# Patient Record
Sex: MALE | Race: BLACK OR AFRICAN AMERICAN | NOT HISPANIC OR LATINO | Employment: UNEMPLOYED | URBAN - METROPOLITAN AREA
[De-identification: names, ages, dates, MRNs, and addresses within clinical notes are randomized per-mention and may not be internally consistent; named-entity substitution may affect disease eponyms.]

---

## 2023-03-20 ENCOUNTER — HOSPITAL ENCOUNTER (EMERGENCY)
Facility: HOSPITAL | Age: 23
Discharge: HOME | End: 2023-03-20
Attending: EMERGENCY MEDICINE
Payer: MEDICAID

## 2023-03-20 VITALS
HEART RATE: 71 BPM | DIASTOLIC BLOOD PRESSURE: 60 MMHG | TEMPERATURE: 98 F | RESPIRATION RATE: 18 BRPM | WEIGHT: 130 LBS | SYSTOLIC BLOOD PRESSURE: 121 MMHG | OXYGEN SATURATION: 98 %

## 2023-03-20 DIAGNOSIS — S00.83XA CONTUSION OF OTHER PART OF HEAD, INITIAL ENCOUNTER: Primary | ICD-10-CM

## 2023-03-20 DIAGNOSIS — S06.9X9A: ICD-10-CM

## 2023-03-20 PROCEDURE — 25000003 PHARM REV CODE 250

## 2023-03-20 PROCEDURE — 99284 EMERGENCY DEPT VISIT MOD MDM: CPT | Mod: 25

## 2023-03-20 PROCEDURE — 12013 RPR F/E/E/N/L/M 2.6-5.0 CM: CPT

## 2023-03-20 RX ORDER — ACETAMINOPHEN 500 MG
1000 TABLET ORAL
Status: COMPLETED | OUTPATIENT
Start: 2023-03-20 | End: 2023-03-20

## 2023-03-20 RX ORDER — MUPIROCIN 20 MG/G
OINTMENT TOPICAL 2 TIMES DAILY
Qty: 15 G | Refills: 0 | Status: SHIPPED | OUTPATIENT
Start: 2023-03-20 | End: 2023-03-27

## 2023-03-20 RX ORDER — LIDOCAINE HYDROCHLORIDE 10 MG/ML
10 INJECTION INFILTRATION; PERINEURAL
Status: COMPLETED | OUTPATIENT
Start: 2023-03-20 | End: 2023-03-20

## 2023-03-20 RX ORDER — ACETAMINOPHEN 500 MG
500 TABLET ORAL EVERY 6 HOURS PRN
Qty: 28 TABLET | Refills: 0 | Status: SHIPPED | OUTPATIENT
Start: 2023-03-20 | End: 2023-03-27

## 2023-03-20 RX ORDER — MUPIROCIN 20 MG/G
1 OINTMENT TOPICAL
Status: COMPLETED | OUTPATIENT
Start: 2023-03-20 | End: 2023-03-20

## 2023-03-20 RX ORDER — IBUPROFEN 600 MG/1
600 TABLET ORAL EVERY 6 HOURS PRN
Qty: 20 TABLET | Refills: 0 | Status: SHIPPED | OUTPATIENT
Start: 2023-03-20

## 2023-03-20 RX ADMIN — MUPIROCIN 22 G: 20 OINTMENT TOPICAL at 06:03

## 2023-03-20 RX ADMIN — ACETAMINOPHEN 1000 MG: 500 TABLET ORAL at 04:03

## 2023-03-20 RX ADMIN — LIDOCAINE HYDROCHLORIDE 10 ML: 10 INJECTION, SOLUTION INFILTRATION; PERINEURAL at 04:03

## 2023-03-20 NOTE — ED NOTES
Irrigated lac to forehead with sterile normal saline. Had pt wash his wound to his right hand with warm water and soap

## 2023-03-20 NOTE — ED NOTES
Pt is aaox3. Nadn. Resp eu. Vss. Applied abx ointment and nonstick dressing over lac repair. Pt ambulated to wr w/steady gait

## 2023-03-20 NOTE — ED PROVIDER NOTES
Encounter Date: 3/20/2023       History     Chief Complaint   Patient presents with    Fall     Mechanical unwitnessed fall hit left side of forehead. Pt reports LOC. PT reports getting up by self. Laceration to forehead from hitting head on car sharlene. Pt unaware of last tetanus shot.     Levy Crawford is a 22-year-old male with no pertinent past medical history who presents to the emergency department with chief complaint of fall.  Was in his usual state of health until approximately 1 hour prior to arrival when he had a mechanical trip and fall in his home and hit his head on what he believes was a car Sharlene or the side of a table.  He had been drinking alcohol prior to this event.  He reports a loss of consciousness.  Unknown how long he was unconscious for.  He woke up by himself without being shaken awake.  He denies any seizure activity or vomiting since the time of the injury.  He is not on any blood thinners.  Reports dull pain to the area at this time.    The history is provided by the patient and a friend. No  was used.   Review of patient's allergies indicates:   Allergen Reactions    Amoxicillin Swelling     History reviewed. No pertinent past medical history.  History reviewed. No pertinent surgical history.  History reviewed. No pertinent family history.  Social History     Tobacco Use    Smoking status: Never    Smokeless tobacco: Never   Substance Use Topics    Alcohol use: Yes     Comment: occassionally    Drug use: Never     Review of Systems   Constitutional:  Negative for fever.   HENT:  Negative for sore throat.    Eyes:  Negative for visual disturbance.   Respiratory:  Negative for shortness of breath.    Cardiovascular:  Negative for chest pain.   Gastrointestinal:  Negative for nausea and vomiting.   Genitourinary:  Negative for dysuria.   Musculoskeletal:  Negative for back pain.   Skin:  Negative for rash.   Neurological:  Positive for syncope and headaches. Negative for  seizures and weakness.   Hematological:  Does not bruise/bleed easily.     Physical Exam     Initial Vitals [03/20/23 0347]   BP Pulse Resp Temp SpO2   110/67 99 18 98.8 °F (37.1 °C) 99 %      MAP       --         Physical Exam    Nursing note and vitals reviewed.  Constitutional: Vital signs are normal. He appears well-developed and well-nourished. He is cooperative. He does not appear ill. No distress.   HENT:   Head: Normocephalic. Head is with abrasion, with contusion and with laceration. Head is without raccoon's eyes and without Tovar's sign.       Right Ear: External ear normal. No hemotympanum.   Left Ear: External ear normal. No hemotympanum.   Nose: Nose normal. No nasal deformity. No epistaxis.   Mouth/Throat: Uvula is midline.   Eyes: Conjunctivae and EOM are normal. Pupils are equal, round, and reactive to light.   Neck: Phonation normal.   Normal range of motion.  Cardiovascular:  Normal rate, regular rhythm, S1 normal, S2 normal and normal heart sounds.           No murmur heard.  Pulmonary/Chest: Effort normal. No respiratory distress.   Abdominal: Abdomen is flat. He exhibits no distension. There is no abdominal tenderness.   Musculoskeletal:      Cervical back: Normal range of motion.     Neurological: He is alert and oriented to person, place, and time. He has normal strength. No cranial nerve deficit or sensory deficit. Gait normal. GCS eye subscore is 4. GCS verbal subscore is 5. GCS motor subscore is 6.   Skin: Skin is warm and dry. Capillary refill takes less than 2 seconds. No rash noted.       ED Course   Lac Repair    Date/Time: 3/20/2023 5:34 AM  Performed by: Forest Darnell PA-C  Authorized by: Cordell Daniels MD     Consent:     Consent obtained:  Verbal    Consent given by:  Patient    Risks, benefits, and alternatives were discussed: yes      Risks discussed:  Infection, pain, retained foreign body and need for additional repair    Alternatives discussed:  No treatment  Universal  protocol:     Procedure explained and questions answered to patient or proxy's satisfaction: yes      Patient identity confirmed:  Verbally with patient, hospital-assigned identification number and arm band  Anesthesia:     Anesthesia method:  Local infiltration    Local anesthetic:  Lidocaine 1% w/o epi  Laceration details:     Location:  Face    Face location:  Forehead    Length (cm):  3    Depth (mm):  2  Pre-procedure details:     Preparation:  Patient was prepped and draped in usual sterile fashion  Exploration:     Limited defect created (wound extended): no      Hemostasis achieved with:  Direct pressure    Imaging obtained comment:  CT head    Imaging outcome: foreign body not noted      Wound exploration: wound explored through full range of motion and entire depth of wound visualized      Contaminated: no    Treatment:     Area cleansed with:  Saline    Amount of cleaning:  Standard    Irrigation solution:  Sterile saline    Irrigation volume:  250 mL    Irrigation method:  Pressure wash    Debridement:  None    Undermining:  None    Scar revision: no    Skin repair:     Repair method:  Sutures    Suture size:  5-0    Suture material:  Fast-absorbing gut    Suture technique:  Simple interrupted    Number of sutures:  7  Approximation:     Approximation:  Close  Repair type:     Repair type:  Simple  Post-procedure details:     Dressing:  Antibiotic ointment and sterile dressing    Procedure completion:  Tolerated well, no immediate complications  Labs Reviewed - No data to display       Imaging Results              CT Head Without Contrast (In process)                      Medications   mupirocin 2 % ointment 22 g (has no administration in time range)   LIDOcaine HCL 10 mg/ml (1%) injection 10 mL (10 mLs Infiltration Given 3/20/23 5066)   acetaminophen tablet 1,000 mg (1,000 mg Oral Given 3/20/23 4216)     Medical Decision Making:   Initial Assessment:   22-year-old male presenting to the emergency  department after a mechanical fall resulting in head trauma loss of consciousness.  Differential Diagnosis:   Differential diagnosis includes but is not limited to contusion or laceration of the scalp, skull fracture, intracranial hemorrhage, traumatic brain injury, concussion  Clinical Tests:   Radiological Study: Ordered and Reviewed  ED Management:  Patient presenting to the emergency department after a trip and fall secondary to alcohol use and resulting in head trauma with loss of consciousness.  Contusion and laceration present on the patient's forehead.  Unwitnessed fall, unclear exact mechanism of action and duration of loss of consciousness.  On physical exam, patient is in no acute distress.  He does appear mildly intoxicated.  Normal neurological exam.  Per [place] head CT rules, head CT was obtained.  Head CT with no acute intracranial abnormalities.  Head laceration repaired as described in procedure note.  Patient tolerated the procedure well with no immediate complications.    On reassessment, patient is in no acute distress and all vital signs are within normal limits.  Again, normal neurological exam.  Safe to be discharged home.  Discussed wound care.  Patient will return to the emergency department 3 days for a wound check. Return precautions were discussed, all patient questions were answered, and the patient was agreeable to the plan of care.  He was discharged home in stable condition and will follow up with his primary care provider or return to the emergency department if his symptoms worsen or do not improve.                         Clinical Impression:   Final diagnoses:  [S00.83XA] Contusion of other part of head, initial encounter (Primary)  [S06.9X9A] Acute head injury with loss of consciousness but no other complication, initial encounter        ED Disposition Condition    Discharge Stable          ED Prescriptions       Medication Sig Dispense Start Date End Date Auth. Provider     acetaminophen (TYLENOL) 500 MG tablet Take 1 tablet (500 mg total) by mouth every 6 (six) hours as needed. 28 tablet 3/20/2023 3/27/2023 Forest Darnell PA-C    ibuprofen (ADVIL,MOTRIN) 600 MG tablet Take 1 tablet (600 mg total) by mouth every 6 (six) hours as needed for Pain. 20 tablet 3/20/2023 -- Forest Darnell PA-C    mupirocin (BACTROBAN) 2 % ointment Apply topically 2 (two) times daily. for 7 days 15 g 3/20/2023 3/27/2023 Forest Darnell PA-C          Follow-up Information       Follow up With Specialties Details Why Contact Info     [facility]  Schedule an appointment as soon as possible for a visit in 3 days For wound re-check [address] [facility] [address]  [phone]      [facility] Emergency Medicine Go in 3 days For wound re-check [address] [facility] [address] [phone]             Forest Darnell PA-C  03/20/23 0549

## 2023-03-20 NOTE — ED NOTES
Pt to ED c/o lac to left forehead s/p trip and fall, pt reports LOC, had blurry vision earlier but has resolved. Denies n/v. Pt also has wound to right hand from fall

## 2023-03-20 NOTE — DISCHARGE INSTRUCTIONS
Thank you for coming to our Emergency Department today. It is important to remember that some problems are difficult to diagnose and may not be found during your first visit. Be sure to follow up with your primary care doctor and review any labs/imaging that was performed with them. If you do not have a primary care doctor, you may contact the one listed on your discharge paperwork or you may also call the [facility] Appointment Desk at [phone] to schedule an appointment with one.     All medications may potentially have side effects and it is impossible to predict which medications may give you side effects. If you feel that you are having a negative effect of any medication you should immediately stop taking them and seek medical attention.    Return to the ER with any questions/concerns, new/concerning symptoms, worsening or failure to improve. Do not drive or make any important decisions for 24 hours if you have received any pain medications, sedatives or mood altering drugs during your ER visit.

## 2024-12-06 ENCOUNTER — HOSPITAL ENCOUNTER (EMERGENCY)
Facility: HOSPITAL | Age: 24
Discharge: HOME | End: 2024-12-06
Attending: EMERGENCY MEDICINE
Payer: MEDICAID

## 2024-12-06 VITALS
OXYGEN SATURATION: 100 % | BODY MASS INDEX: 25.07 KG/M2 | DIASTOLIC BLOOD PRESSURE: 88 MMHG | SYSTOLIC BLOOD PRESSURE: 122 MMHG | HEART RATE: 65 BPM | HEIGHT: 66 IN | TEMPERATURE: 99 F | RESPIRATION RATE: 20 BRPM | WEIGHT: 156 LBS

## 2024-12-06 DIAGNOSIS — R51.9 ACUTE NONINTRACTABLE HEADACHE, UNSPECIFIED HEADACHE TYPE: Primary | ICD-10-CM

## 2024-12-06 LAB — POCT GLUCOSE: 77 MG/DL (ref 70–110)

## 2024-12-06 PROCEDURE — 25000003 PHARM REV CODE 250: Performed by: PHYSICIAN ASSISTANT

## 2024-12-06 PROCEDURE — 99283 EMERGENCY DEPT VISIT LOW MDM: CPT

## 2024-12-06 PROCEDURE — 82962 GLUCOSE BLOOD TEST: CPT

## 2024-12-06 RX ORDER — AZELASTINE 1 MG/ML
1 SPRAY, METERED NASAL 2 TIMES DAILY
Qty: 30 ML | Refills: 0 | Status: SHIPPED | OUTPATIENT
Start: 2024-12-06 | End: 2025-12-06

## 2024-12-06 RX ORDER — CETIRIZINE HYDROCHLORIDE 10 MG/1
10 TABLET ORAL DAILY
Qty: 14 TABLET | Refills: 0 | Status: SHIPPED | OUTPATIENT
Start: 2024-12-06 | End: 2024-12-20

## 2024-12-06 RX ORDER — ACETAMINOPHEN 500 MG
1000 TABLET ORAL
Status: COMPLETED | OUTPATIENT
Start: 2024-12-06 | End: 2024-12-06

## 2024-12-06 RX ORDER — CETIRIZINE HYDROCHLORIDE 10 MG/1
10 TABLET ORAL
Status: COMPLETED | OUTPATIENT
Start: 2024-12-06 | End: 2024-12-06

## 2024-12-06 RX ADMIN — ACETAMINOPHEN 1000 MG: 500 TABLET ORAL at 11:12

## 2024-12-06 RX ADMIN — CETIRIZINE HYDROCHLORIDE 10 MG: 10 TABLET, FILM COATED ORAL at 11:12

## 2024-12-07 NOTE — DISCHARGE INSTRUCTIONS
Continue Tylenol or Ibuprofen as needed for headache. Begin taking Zyrtec daily, begin using Astelin nasal spray daily.     Use information provided to follow up and establish care with a local primary care provider.    Please return to this ED if no improvement with current plan, if you develop fever, if you begin with neck stiffness, if you feel lightheaded or feel as if you are going to pass out, if you begin with vomiting, if any other problems occur.

## 2024-12-07 NOTE — ED PROVIDER NOTES
Encounter Date: 12/6/2024       History     Chief Complaint   Patient presents with    Headache     Pt c/o left sided facial headache ongoing intermittently for a few days but currently denies; pt last took Tylenol at 11am; denies any other symptoms or hx of frequent headaches     23yo M presents to ED with chief complaint L frontal and temporal HA x 3d.    Patient denies history of similar headaches.  Denies any recent illness.  No head trauma.  Denies any sinus issues.  No nasal congestion or rhinorrhea.  No neck pain or stiffness.  No fever chills myalgias.  No visual disturbance.  Headache mostly to the left forehead, left temple.  Headache has been intermittent.  There is relief with Tylenol, Goody powder.  Last dose earlier this morning, no recent medications.  Headache is worsened when he leans forward, when he lies back.  No radiation of symptoms.  No arm or leg weakness, slurred speech, facial droop, unsteady gait, aphasia.  No history of CVA. Denies any known personal or family history of subarachnoid hemorrhage or cerebral aneurysm.    Denies significant pmh  No daily prescription medication  No local PCP      Review of patient's allergies indicates:   Allergen Reactions    Amoxicillin Swelling     No past medical history on file.  No past surgical history on file.  No family history on file.     Review of Systems   Constitutional:  Negative for chills and fever.   HENT:  Negative for congestion, rhinorrhea, sinus pressure and sinus pain.    Eyes:  Negative for visual disturbance.   Gastrointestinal:  Negative for nausea and vomiting.   Musculoskeletal:  Negative for myalgias, neck pain and neck stiffness.   Neurological:  Positive for headaches. Negative for syncope, facial asymmetry, speech difficulty and weakness.       Physical Exam     Initial Vitals [12/06/24 2146]   BP Pulse Resp Temp SpO2   (!) 127/91 74 17 98.7 °F (37.1 °C) 98 %      MAP       --         Physical Exam    Nursing note and vitals  reviewed.  Constitutional: He appears well-developed and well-nourished. He is not diaphoretic. No distress.   Well-appearing nontoxic.  Ambulates with normal, steady gait.   HENT:   Head: Normocephalic and atraumatic.   Nasal congestion, turbinate hypertrophy bilaterally right-greater-than-left.    Neck: Neck supple.   Normal range of motion.  Cardiovascular:  Normal rate and regular rhythm.           Pulmonary/Chest: No respiratory distress.   Musculoskeletal:         General: Normal range of motion.      Cervical back: Normal range of motion and neck supple.     Neurological: He is alert and oriented to person, place, and time. He has normal strength. GCS score is 15. GCS eye subscore is 4. GCS verbal subscore is 5. GCS motor subscore is 6.   No focal deficits   Skin: Skin is warm.   Psychiatric: He has a normal mood and affect. Thought content normal.         ED Course   Procedures  Labs Reviewed   POCT GLUCOSE       Result Value    POCT Glucose 77            Imaging Results    None          Medications   acetaminophen tablet 1,000 mg (1,000 mg Oral Given 12/6/24 2348)   cetirizine tablet 10 mg (10 mg Oral Given 12/6/24 2348)     Medical Decision Making  Differential diagnosis:  Headache disorder, migraine, sinusitis, CVA, subarachnoid hemorrhage, subdural hematoma, cerebral aneurysm    Amount and/or Complexity of Data Reviewed  Discussion of management or test interpretation with external provider(s): Young and otherwise healthy with no significant comorbidities.  Reassuring vitals.  States symptoms improved when he does take medications.  Advised to continue with Tylenol, ibuprofen p.r.n..  Denies any URI complaints, sinus issues, however there is nasal congestion, headache is worsened with changes in head position.  Will discharge with antihistamines and Flonase.  Referral placed to establish care with a local PCP.  Discussed interim return precautions and red flags.  Patient comfortable with current  plan.    Risk  OTC drugs.  Prescription drug management.                                      Clinical Impression:  Final diagnoses:  [R51.9] Acute nonintractable headache, unspecified headache type (Primary)          ED Disposition Condition    Discharge Stable          ED Prescriptions       Medication Sig Dispense Start Date End Date Auth. Provider    cetirizine (ZYRTEC) 10 MG tablet Take 1 tablet (10 mg total) by mouth once daily. for 14 days 14 tablet 12/6/2024 12/20/2024 Adrian Heard PA-C    azelastine (ASTELIN) 137 mcg (0.1 %) nasal spray 1 spray (137 mcg total) by Nasal route 2 (two) times daily. 30 mL 12/6/2024 12/6/2025 Adrian Heard PA-C          Follow-up Information       Follow up With Specialties Details Why Contact Info    [facility] -  Schedule an appointment as soon as possible for a visit  To establish primary care physician, for reevaluation [address]  [place] [address]  [phone]      [facility]  Schedule an appointment as soon as possible for a visit  To establish primary care physician, For reevaluation [address] [facility] [place] [address]  [phone]      [facility] Schedule an appointment as soon as possible for a visit  To establish primary care physician, For reevaluation [address] [facility] [address]  [phone]               Adrian Heard PA-C  12/07/24 0507

## 2025-01-01 ENCOUNTER — HOSPITAL ENCOUNTER (EMERGENCY)
Facility: HOSPITAL | Age: 25
Discharge: HOME | End: 2025-01-01
Attending: EMERGENCY MEDICINE
Payer: COMMERCIAL

## 2025-01-01 VITALS
SYSTOLIC BLOOD PRESSURE: 113 MMHG | HEIGHT: 66 IN | TEMPERATURE: 98 F | DIASTOLIC BLOOD PRESSURE: 76 MMHG | RESPIRATION RATE: 16 BRPM | BODY MASS INDEX: 26.52 KG/M2 | WEIGHT: 165 LBS | HEART RATE: 70 BPM | OXYGEN SATURATION: 98 %
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
## 2025-01-01 DIAGNOSIS — M25.511 RIGHT SHOULDER PAIN: ICD-10-CM

## 2025-01-01 PROCEDURE — 99285 EMERGENCY DEPT VISIT HI MDM: CPT | Mod: 25,ER

## 2025-01-01 PROCEDURE — 25000003 PHARM REV CODE 250: Mod: ER | Performed by: EMERGENCY MEDICINE

## 2025-01-01 RX ORDER — METHOCARBAMOL 500 MG/1
1000 TABLET, FILM COATED ORAL 3 TIMES DAILY
Qty: 30 TABLET | Refills: 0 | Status: SHIPPED | OUTPATIENT
Start: 2025-01-01 | End: 2025-01-06

## 2025-01-01 RX ORDER — IBUPROFEN 600 MG/1
600 TABLET ORAL EVERY 6 HOURS PRN
Qty: 20 TABLET | Refills: 0 | Status: SHIPPED | OUTPATIENT
Start: 2025-01-01

## 2025-01-01 RX ORDER — DICLOFENAC SODIUM 10 MG/G
2 GEL TOPICAL 4 TIMES DAILY PRN
Qty: 200 G | Refills: 0 | Status: SHIPPED | OUTPATIENT
Start: 2025-01-01

## 2025-01-01 RX ORDER — METHOCARBAMOL 500 MG/1
1000 TABLET, FILM COATED ORAL
Status: COMPLETED | OUTPATIENT
Start: 2025-01-01 | End: 2025-01-01

## 2025-01-01 RX ORDER — ACETAMINOPHEN 500 MG
500 TABLET ORAL EVERY 6 HOURS PRN
Qty: 30 TABLET | Refills: 0 | Status: SHIPPED | OUTPATIENT
Start: 2025-01-01

## 2025-01-01 RX ORDER — KETOROLAC TROMETHAMINE 10 MG/1
10 TABLET, FILM COATED ORAL
Status: COMPLETED | OUTPATIENT
Start: 2025-01-01 | End: 2025-01-01

## 2025-01-01 RX ADMIN — KETOROLAC TROMETHAMINE 10 MG: 10 TABLET, FILM COATED ORAL at 09:01

## 2025-01-01 RX ADMIN — METHOCARBAMOL 1000 MG: 500 TABLET ORAL at 09:01
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
## 2025-01-01 NOTE — LETTER
Patient: Freny Rojas  YOB: 2000  Date: 1/1/2025 Time: 10:32 AM  Location: [facility]    Leaving the Hospital Against Medical Advice    Chart #:[id number]    This will certify that I, the undersigned,    ______________________________________________________________________    A patient in the above named medical center, having requested discharge and removal from the medical center against the advice of my attending physician(s), hereby release [facility], its physicians, officers and employees, severally and individually, from any and all liability of any nature whatsoever for any injury or harm or complication of any kind that may result directly or indirectly, by reason of my terminating my stay as a patient at [facility] and my departure from [facility], and hereby waive any and all rights of action I may now have or later acquire as a result of my voluntary departure from [facility] and the termination of my stay as a patient therein.    This release is made with the full knowledge of the danger that may result from the action which I am taking.      Date:_______________________                         ___________________________                                                                                    Patient/Legal Representative    Witness:        ____________________________                          ___________________________  Nurse                                                                        Physician

## 2025-01-01 NOTE — Clinical Note
"Ferny Rojas (Jermaine) was seen and treated in our emergency department on 1/1/2025.  He may return to work on 01/03/2025.       If you have any questions or concerns, please don't hesitate to call.      JOSE DELGADO    "